# Patient Record
(demographics unavailable — no encounter records)

---

## 2025-02-03 NOTE — ASSESSMENT
[FreeTextEntry1] : 26 y.o man with pruritis ani, with normal physical exam today.  I offer Neo Calmoseptine with instructions for PRN use for pruritis itch symptoms.  RTN prn

## 2025-02-03 NOTE — PHYSICAL EXAM
[FreeTextEntry1] : anorectal exam with visual ERIN anoscopy and proctoscopy is normal. Normal EH ERIN, tone NT, no palp masesses IH grade 0-1 Proctoscopy to 10 cm normal distal rectal mucosa

## 2025-02-03 NOTE — PROCEDURE
[FreeTextEntry1] :  Anoscopy is a diagnostic procedure that involves the insertion of a specialized tube, called an anoscope, into the lower anorectum to visualize the anal canal and lower rectum. By providing direct visualization, anoscopy aids in the accurate diagnosis of various conditions such as hemorrhoids, anal fissures, and certain infections. It is also a necessary part of many office-based procedures, such as treatment of internal hemorrhoids. Verbal consent obtained with patient prior to office exam today.  Patient informed that Rigid Proctoscopy involves the insertion of a slender tube, known as a proctoscope, into the rectum, advanced above the anal canal, to directly examine the rectal lining. Proctoscopy enables the assessment and detection of conditions such as inflammation, polyps, tumors or other sources of bleeding and symptoms which may be located in the lower portion of the rectum. Verbal /written consent obtained with patient prior to examination.  With patient positioned left lateral decubitus, a lubricated rigid proctoscope is inserted into the anorectum and advanced to a distance of approximately 15 cm. Visualization of the rectal mucosa performed with limitations due to pt discomfort and/or presence of stool in the rectal vault. Procedure completed without known complication.  Results and findings discussed with pt with further treatment recommendations as appropriate.

## 2025-02-03 NOTE — HISTORY OF PRESENT ILLNESS
[FreeTextEntry1] : 26 male feels something when he cleansup after BM, Issues have been present for years. "Come and go" More of an itch that occurs sometimes after a BM. Stools are pt reported daily and soft formed Denies pain with defecation. Denies hx or abscess or anorectal examiniation by doctor. He uses TP finger into anal canal as part of hygeine and occ feels something. Not a mass or lump. Maybe towards the anterior midline but not on surface or on perineum. Occ BRBPR small on TP maybe 1-2 q week  Denies FH  CRC